# Patient Record
Sex: MALE | Race: WHITE | NOT HISPANIC OR LATINO | Employment: OTHER | URBAN - METROPOLITAN AREA
[De-identification: names, ages, dates, MRNs, and addresses within clinical notes are randomized per-mention and may not be internally consistent; named-entity substitution may affect disease eponyms.]

---

## 2017-05-06 ENCOUNTER — APPOINTMENT (OUTPATIENT)
Dept: MRI IMAGING | Facility: CLINIC | Age: 67
End: 2017-05-06
Attending: EMERGENCY MEDICINE
Payer: MEDICARE

## 2017-05-06 ENCOUNTER — HOSPITAL ENCOUNTER (EMERGENCY)
Facility: CLINIC | Age: 67
Discharge: HOME | End: 2017-05-06
Attending: EMERGENCY MEDICINE | Admitting: EMERGENCY MEDICINE
Payer: MEDICARE

## 2017-05-06 VITALS
WEIGHT: 185 LBS | SYSTOLIC BLOOD PRESSURE: 124 MMHG | OXYGEN SATURATION: 98 % | HEART RATE: 64 BPM | TEMPERATURE: 97.6 F | BODY MASS INDEX: 24.52 KG/M2 | RESPIRATION RATE: 18 BRPM | HEIGHT: 73 IN | DIASTOLIC BLOOD PRESSURE: 94 MMHG

## 2017-05-06 DIAGNOSIS — D69.6 THROMBOCYTOPENIA (H): ICD-10-CM

## 2017-05-06 DIAGNOSIS — R42 VERTIGO: ICD-10-CM

## 2017-05-06 LAB
ANION GAP SERPL CALCULATED.3IONS-SCNC: 6 MMOL/L (ref 3–14)
BASOPHILS # BLD AUTO: 0 10E9/L (ref 0–0.2)
BASOPHILS NFR BLD AUTO: 0.4 %
BUN SERPL-MCNC: 17 MG/DL (ref 7–30)
CALCIUM SERPL-MCNC: 8.6 MG/DL (ref 8.5–10.1)
CHLORIDE SERPL-SCNC: 108 MMOL/L (ref 94–109)
CO2 SERPL-SCNC: 29 MMOL/L (ref 20–32)
CREAT SERPL-MCNC: 0.82 MG/DL (ref 0.66–1.25)
DIFFERENTIAL METHOD BLD: ABNORMAL
EOSINOPHIL # BLD AUTO: 0.1 10E9/L (ref 0–0.7)
EOSINOPHIL NFR BLD AUTO: 2 %
ERYTHROCYTE [DISTWIDTH] IN BLOOD BY AUTOMATED COUNT: 12.4 % (ref 10–15)
GFR SERPL CREATININE-BSD FRML MDRD: ABNORMAL ML/MIN/1.7M2
GLUCOSE SERPL-MCNC: 140 MG/DL (ref 70–99)
HCT VFR BLD AUTO: 44.9 % (ref 40–53)
HGB BLD-MCNC: 15.7 G/DL (ref 13.3–17.7)
IMM GRANULOCYTES # BLD: 0 10E9/L (ref 0–0.4)
IMM GRANULOCYTES NFR BLD: 0.6 %
INTERPRETATION ECG - MUSE: NORMAL
LYMPHOCYTES # BLD AUTO: 1 10E9/L (ref 0.8–5.3)
LYMPHOCYTES NFR BLD AUTO: 14.7 %
MCH RBC QN AUTO: 31.5 PG (ref 26.5–33)
MCHC RBC AUTO-ENTMCNC: 35 G/DL (ref 31.5–36.5)
MCV RBC AUTO: 90 FL (ref 78–100)
MONOCYTES # BLD AUTO: 0.3 10E9/L (ref 0–1.3)
MONOCYTES NFR BLD AUTO: 4.2 %
NEUTROPHILS # BLD AUTO: 5.4 10E9/L (ref 1.6–8.3)
NEUTROPHILS NFR BLD AUTO: 78.1 %
NRBC # BLD AUTO: 0 10*3/UL
NRBC BLD AUTO-RTO: 0 /100
PLATELET # BLD AUTO: 127 10E9/L (ref 150–450)
POTASSIUM SERPL-SCNC: 3.7 MMOL/L (ref 3.4–5.3)
RBC # BLD AUTO: 4.98 10E12/L (ref 4.4–5.9)
SODIUM SERPL-SCNC: 143 MMOL/L (ref 133–144)
WBC # BLD AUTO: 6.9 10E9/L (ref 4–11)

## 2017-05-06 PROCEDURE — A9585 GADOBUTROL INJECTION: HCPCS | Performed by: EMERGENCY MEDICINE

## 2017-05-06 PROCEDURE — 25000131 ZZH RX MED GY IP 250 OP 636 PS 637: Performed by: EMERGENCY MEDICINE

## 2017-05-06 PROCEDURE — 99285 EMERGENCY DEPT VISIT HI MDM: CPT | Mod: 25

## 2017-05-06 PROCEDURE — 85025 COMPLETE CBC W/AUTO DIFF WBC: CPT | Performed by: EMERGENCY MEDICINE

## 2017-05-06 PROCEDURE — 25500064 ZZH RX 255 OP 636: Performed by: EMERGENCY MEDICINE

## 2017-05-06 PROCEDURE — 70544 MR ANGIOGRAPHY HEAD W/O DYE: CPT

## 2017-05-06 PROCEDURE — 25000132 ZZH RX MED GY IP 250 OP 250 PS 637: Performed by: EMERGENCY MEDICINE

## 2017-05-06 PROCEDURE — 70549 MR ANGIOGRAPH NECK W/O&W/DYE: CPT

## 2017-05-06 PROCEDURE — 27210995 ZZH RX 272: Performed by: EMERGENCY MEDICINE

## 2017-05-06 PROCEDURE — 70553 MRI BRAIN STEM W/O & W/DYE: CPT

## 2017-05-06 PROCEDURE — 80048 BASIC METABOLIC PNL TOTAL CA: CPT | Performed by: EMERGENCY MEDICINE

## 2017-05-06 PROCEDURE — 93005 ELECTROCARDIOGRAM TRACING: CPT

## 2017-05-06 RX ORDER — ONDANSETRON 4 MG/1
4 TABLET, ORALLY DISINTEGRATING ORAL EVERY 4 HOURS PRN
Qty: 10 TABLET | Refills: 0 | Status: SHIPPED | OUTPATIENT
Start: 2017-05-06 | End: 2017-05-09

## 2017-05-06 RX ORDER — GADOBUTROL 604.72 MG/ML
10 INJECTION INTRAVENOUS ONCE
Status: COMPLETED | OUTPATIENT
Start: 2017-05-06 | End: 2017-05-06

## 2017-05-06 RX ORDER — ACYCLOVIR 200 MG/1
60 CAPSULE ORAL ONCE
Status: COMPLETED | OUTPATIENT
Start: 2017-05-06 | End: 2017-05-06

## 2017-05-06 RX ORDER — MECLIZINE HYDROCHLORIDE 25 MG/1
25 TABLET ORAL ONCE
Status: COMPLETED | OUTPATIENT
Start: 2017-05-06 | End: 2017-05-06

## 2017-05-06 RX ORDER — MECLIZINE HYDROCHLORIDE 25 MG/1
25 TABLET ORAL EVERY 6 HOURS PRN
Qty: 20 TABLET | Refills: 0 | Status: SHIPPED | OUTPATIENT
Start: 2017-05-06

## 2017-05-06 RX ORDER — LORAZEPAM 1 MG/1
1 TABLET ORAL ONCE
Status: COMPLETED | OUTPATIENT
Start: 2017-05-06 | End: 2017-05-06

## 2017-05-06 RX ADMIN — SODIUM CHLORIDE 60 ML: 9 INJECTION, SOLUTION INTRAMUSCULAR; INTRAVENOUS; SUBCUTANEOUS at 10:47

## 2017-05-06 RX ADMIN — GADOBUTROL 10 ML: 604.72 INJECTION INTRAVENOUS at 10:47

## 2017-05-06 RX ADMIN — LORAZEPAM 1 MG: 1 TABLET ORAL at 08:21

## 2017-05-06 RX ADMIN — MECLIZINE HYDROCHLORIDE 25 MG: 25 TABLET ORAL at 08:20

## 2017-05-06 ASSESSMENT — ENCOUNTER SYMPTOMS
PHOTOPHOBIA: 0
NAUSEA: 1
RHINORRHEA: 1
NECK PAIN: 1
VOMITING: 0
COUGH: 0
HEADACHES: 0
WEAKNESS: 0
DIZZINESS: 1
NUMBNESS: 0

## 2017-05-06 NOTE — ED AVS SNAPSHOT
Emergency Department    [address] [place] [address]    Phone:  [phone]    Fax:  [phone]                                       George Smith   MRN: [id number]    Department:   Emergency Department   Date of Visit:  5/6/2017           After Visit Summary Signature Page     I have received my discharge instructions, and my questions have been answered. I have discussed any challenges I see with this plan with the nurse or doctor.    ..........................................................................................................................................  Patient/Patient Representative Signature      ..........................................................................................................................................  Patient Representative Print Name and Relationship to Patient    ..................................................               ................................................  Date                                            Time    ..........................................................................................................................................  Reviewed by Signature/Title    ...................................................              ..............................................  Date                                                            Time

## 2017-05-06 NOTE — ED PROVIDER NOTES
History     Chief Complaint:  Dizziness      HPI   George Smith is a 66 year old male who presents with dizziness.  The patient reports waking up in the middle of the night feeling dizzy, as if the room was spinning.  He continued to feel dizzy and off balance today, especially noting his equilibrium seems off when walking.  He also feels slightly nauseous.  He has been going to a chiropractor regularly for some neck pain and had a new practitioner yesterday who seemed to have more difficulty adjusting his neck.  He had no increased neck pain at that time or since.  He denies any numbness, tingling, or weakness in his arms or legs.  He has had no ringing in his ears or sensitivity to light.  He has had some recent allergy symptoms for which he normally takes Claritin however is out and started taking a short acting OTC allergy pill in the past few days.  He denies any recent cough or cold symptoms.  He was told his blood pressure was elevated in the past but more recently this has been actually on the low side after he quit drinking alcohol about 5 months ago.      Allergies:  No known drug allergies.     Medications:    OTC allergy medication   Zoloft  Clonazepam     Past Medical History:    Benign prostatic hypertrophy   Anxiety     Past Surgical History:    Transurethral prostatectomy     Family History:    History reviewed. No pertinent family history.     Social History:  Presents to the ED alone.  Living Situation: lives with his mother   Tobacco Use: No previous or current tobacco use.   Alcohol Use: Previous alcohol abuse, sober x 5 months    Review of Systems   HENT: Positive for rhinorrhea and sneezing. Negative for congestion, hearing loss and tinnitus.    Eyes: Negative for photophobia.   Respiratory: Negative for cough.    Gastrointestinal: Positive for nausea. Negative for vomiting.   Musculoskeletal: Positive for neck pain.   Neurological: Positive for dizziness. Negative for weakness, numbness and  "headaches.   All other systems reviewed and are negative.    Physical Exam   First Vitals:  BP: (!) 147/102  Pulse: 63  Temp: 97.6  F (36.4  C)  Resp: 16  Height: 185.4 cm (6' 1\")  Weight: 83.9 kg (185 lb)  SpO2: 97 %      Physical Exam  Constitutional: White male sitting.   HENT: No signs of trauma.   Eyes: EOM are normal. Pupils are 3 mm, equal, round, and reactive to light. No nystagmus.  TMs clear  Neck: Normal range of motion. No JVD present. No cervical adenopathy. No bruits.  Cardiovascular: Regular rhythm.  Exam reveals no gallop and no friction rub.    No murmur heard.  Pulmonary/Chest: Bilateral breath sounds normal. No wheezes, rhonchi or rales.  Abdominal: Soft. No tenderness. No rebound or guarding.   Musculoskeletal: No edema. No tenderness.   Lymphadenopathy: No lymphadenopathy.   Neurological: Alert and oriented to person, place, and time. Normal strength. No facial asymmetry.  Fluent speech.  Normal sensation. Normal finger nose finger. Gait slightly unsteady. Romberg positive.  Skin: Skin is warm and dry. No rash noted. No erythema.      Emergency Department Course   ECG:  @ 0818  Indication: dizziness  Vent. Rate 61 bpm. AK interval 180 ms. QRS duration 84 ms. QT/QTc 426/428 ms. P-R-T axis 61 35 8.   Normal sinus rhythm.  Normal ECG.    Read by Dr. Kong.     Imaging:  Brain MRI without and with contrast:  1. Minimal atrophy and chronic white matter disease.  2. Nothing acute.  Report per radiology.    Neck MRA without and with contrast:  1. Moderate left internal carotid artery origin stenosis.  2. Exam otherwise normal.  3. No evidence for dissection.   Report per radiology.    Head MRA without and with contrast:  Normal [place] MRA.  Report per radiology.    Radiographic findings were communicated with the patient who voiced understanding of the findings.    Laboratory:  CBC:  WBC 6.9, HGB 15.7   BMP: Glucose 140 (H), otherwise WNL (Creatinine 0.82)     Interventions:  (0820) " Meclizine, 25 mg, PO  (0821) Ativan, 1 mg, PO    Emergency Department Course:  Nursing notes and vitals reviewed.  I performed an exam of the patient as documented above.     EKG was done, interpretation as above.    A peripheral IV was established. Blood was drawn from the patient. This was sent for laboratory testing, findings above.       The patient was sent for a brain MRI and head/neck MRA while in the emergency department, findings above.      (1144) Findings and plan explained to the patient.   Patient discharged home with instructions regarding supportive care, medications, and reasons to return. The importance of close follow-up was reviewed. The patient was prescribed Meclizine and Zofran.     Impression & Plan      Medical Decision Making:  Mr. Smith is a 66 year old male who presents to the ED with dizziness.  Mr. Smith states he woke up today and he had an moving type of sensation and when he tried to walk he seemed unbalanced.  He denied any cough or cold symptoms although he does have allergy symptoms and did take a new allergy pill.  He has no double vision, speech or swallowing problems.  He had been a drinker but quit 5 months ago.  On exam I cannot elicit  nystagmus, his TMs are clear, and neurologically he is intact except when he tries to get up and walk and he is unsteady with a positive Romberg.  He received Meclizine and workup includes EKG and blood which is unremarkable except for slightly low platelet count.  MRI brain/MRA of the head and neck is unremarkable other than 60% internal carotid stenosis on the left side.  Patient is improved with his Meclizine.  He is not completely asymptomatic but is stable to be discharged home.  I will give him Meclizine and Zofran.  He does not want any Prednisone and I have referred him to ENT for follow up.  If symptoms worsen, return to the ED.    Diagnosis:    ICD-10-CM    1. Vertigo R42    2.      Thrombocytopenia    Plan:  As noted.    Discharge  Medications:  New Prescriptions    MECLIZINE (ANTIVERT) 25 MG TABLET    Take 1 tablet (25 mg) by mouth every 6 hours as needed for dizziness    ONDANSETRON (ZOFRAN ODT) 4 MG ODT TAB    Take 1 tablet (4 mg) by mouth every 4 hours as needed       ILynn, am serving as a scribe on 5/6/2017 at 8:00 AM to personally document services performed by Dr. Kong based on my observations and the provider's statements to me.    Lnyn Saldana  5/6/2017    EMERGENCY DEPARTMENT       Matthew Kong MD  05/06/17 [id number]

## 2017-05-06 NOTE — ED AVS SNAPSHOT
Emergency Department    [address] [facility] [address]    Phone:  [phone]    Fax:  [phone]                                       George Smith   MRN: [id number]    Department:   Emergency Department   Date of Visit:  5/6/2017           Patient Information     Date Of Birth          1950        Your diagnoses for this visit were:     Vertigo        You were seen by Matthew Kong MD.      Follow-up Information     Schedule an appointment as soon as possible for a visit with Yves Vyas MD.    Specialty:  Otolaryngology    Why:  recheck ed, If symptoms worsen    Contact information:    [facility]  [address] [id number] [phone]          Discharge Instructions         Understanding Dizziness, Balance Problems, and Fainting  Balance is a group effort of the eyes, inner ear, joints, and muscles. They each send signals to the brain about body position and head movement. Then the brain uses this information to achieve balance. When the brain receives conflicting signals, or when there is a problem with blood flow, dizziness or fainting can happen.    Vertigo  Vertigo is the feeling of spinning. It may happen if the brain receives conflicting balance signals. Vertigo is often caused by a problem in the inner ear. Problems include changes in inner ear structures, infection, swelling, or excess fluid. Sometimes vertigo is due to a brain problem, such as migraine.   Dysequilibrium  Dysequilibrium is the feeling of imbalance without a sense of spinning. It may happen if the signal path between the body and brain is disrupted. There are many causes of dysequilibrium, including diabetes, anemia, head injury, and aging.  Syncope  Syncope is losing consciousness or fainting. The brain needs oxygen-rich blood to function. The heart pumps that blood to the brain. If there is a problem with the heart, blood flow (such as low blood pressure), or  blood vessels, faintness may happen.    7824-5931 [employer]. [address] [place] [address]. All rights reserved. This information is not intended as a substitute for professional medical care. Always follow your healthcare professional's instructions.          Managing Dizziness (Vertigo) with Medications  Although medications can't cure your problem, they can help control symptoms. Your doctor may prescribe medications for a few weeks and then taper them off.  If you have side effects from your medications, contact your healthcare provider right away.   How medications can help      Treat infection or inflammation. If you have a bacterial infection, your doctor can prescribe antibiotics.    Limit conflicting balance signals. These medications are often in pill form.    Ease nausea. Suppositories, pills, or shots may be used to reduce vomiting.    Reduce pressure in the canals. Diuretics can be used to treat Meniere's disease. These medications help rid the body of excess fluid.    Ease other symptoms. Other medications can help ease depression and anxiety caused by living with dizziness or fainting.    2405-1583 [employer]. [address] [place] [address]. All rights reserved. This information is not intended as a substitute for professional medical care. Always follow your healthcare professional's instructions.          24 Hour Appointment Hotline       To make an appointment at any [facility], call [phone]). If you don't have a family doctor or clinic, we will help you find one. [facility] clinics are conveniently located to serve the needs of you and your family.             Review of your medicines      START taking        Dose / Directions Last dose taken    meclizine 25 MG tablet   Commonly known as:  ANTIVERT   Dose:  25 mg   Quantity:  20 tablet        Take 1 tablet (25 mg) by mouth every 6 hours as needed for dizziness    Refills:  0        ondansetron 4 MG ODT tab   Commonly known as:  ZOFRAN ODT   Dose:  4 mg   Quantity:  10 tablet        Take 1 tablet (4 mg) by mouth every 4 hours as needed   Refills:  0          Our records show that you are taking the medicines listed below. If these are incorrect, please call your family doctor or clinic.        Dose / Directions Last dose taken    KLONOPIN PO   Dose:  0.5 mg        Take 0.5 mg by mouth daily   Refills:  0        ZOLOFT PO   Dose:  100 mg        Take 100 mg by mouth daily   Refills:  0                Prescriptions were sent or printed at these locations (2 Prescriptions)                   Other Prescriptions                Printed at Department/Unit printer (2 of 2)         ondansetron (ZOFRAN ODT) 4 MG ODT tab               meclizine (ANTIVERT) 25 MG tablet                Procedures and tests performed during your visit     Basic metabolic panel    CBC with platelets differential    EKG 12 lead    MR Brain w/o & w Contrast    MR Head w/o Contrast Angiogram    MR Neck w/o & w Contrast Angiogram      Orders Needing Specimen Collection     None      Pending Results     No orders found from 5/4/2017 to 5/7/2017.            Pending Culture Results     No orders found from 5/4/2017 to 5/7/2017.            Pending Results Instructions     If you had any lab results that were not finalized at the time of your Discharge, you can call the ED Lab Result RN at [phone]. You will be contacted by this team for any positive Lab results or changes in treatment. The nurses are available 7 days a week from 10A to 6:30P.  You can leave a message 24 hours per day and they will return your call.        Test Results From Your Hospital Stay        5/6/2017  8:38 AM      Component Results     Component Value Ref Range & Units Status    Sodium 143 133 - 144 mmol/L Final    Potassium 3.7 3.4 - 5.3 mmol/L Final    Chloride 108 94 - 109 mmol/L Final    Carbon Dioxide 29 20 - 32 mmol/L Final     Anion Gap 6 3 - 14 mmol/L Final    Glucose 140 (H) 70 - 99 mg/dL Final    Urea Nitrogen 17 7 - 30 mg/dL Final    Creatinine 0.82 0.66 - 1.25 mg/dL Final    GFR Estimate >90  Non  GFR Calc   >60 mL/min/1.7m2 Final    GFR Estimate If Black >90   GFR Calc   >60 mL/min/1.7m2 Final    Calcium 8.6 8.5 - 10.1 mg/dL Final         5/6/2017  8:24 AM      Component Results     Component Value Ref Range & Units Status    WBC 6.9 4.0 - 11.0 10e9/L Final    RBC Count 4.98 4.4 - 5.9 10e12/L Final    Hemoglobin 15.7 13.3 - 17.7 g/dL Final    Hematocrit 44.9 40.0 - 53.0 % Final    MCV 90 78 - 100 fl Final    MCH 31.5 26.5 - 33.0 pg Final    MCHC 35.0 31.5 - 36.5 g/dL Final    RDW 12.4 10.0 - 15.0 % Final    Platelet Count 127 (L) 150 - 450 10e9/L Final    Diff Method Automated Method  Final    % Neutrophils 78.1 % Final    % Lymphocytes 14.7 % Final    % Monocytes 4.2 % Final    % Eosinophils 2.0 % Final    % Basophils 0.4 % Final    % Immature Granulocytes 0.6 % Final    Nucleated RBCs 0 0 /100 Final    Absolute Neutrophil 5.4 1.6 - 8.3 10e9/L Final    Absolute Lymphocytes 1.0 0.8 - 5.3 10e9/L Final    Absolute Monocytes 0.3 0.0 - 1.3 10e9/L Final    Absolute Eosinophils 0.1 0.0 - 0.7 10e9/L Final    Absolute Basophils 0.0 0.0 - 0.2 10e9/L Final    Abs Immature Granulocytes 0.0 0 - 0.4 10e9/L Final    Absolute Nucleated RBC 0.0  Final         5/6/2017 11:58 AM      Narrative     MR BRAIN WITH AND WITHOUT CONTRAST May 6, 2017 10:48 AM    HISTORY: Dizziness, unsteady gait.    COMPARISON: None.    TECHNIQUE: Sagittal T1, axial T2, axial FLAIR, axial GRE, axial  diffusion scan, coronal FLAIR, axial post Gd enhanced T1 weighted  images. 10 mL gadolinium.      FINDINGS: Mild atrophy. No intracranial hemorrhage, mass, or recent  infarct. There are only a few tiny foci T2 hyperintensity in the white  matter of each cerebral hemisphere consistent with minimal small  vessel ischemic change. No pathologic  enhancement.    No significant sinus disease.        Impression     IMPRESSION:  1. Minimal atrophy and chronic white matter disease.  2. Nothing acute.    FRENCH BERRY MD         5/6/2017 11:58 AM      Narrative     MRA ANGIOGRAM HEAD WITHOUT CONTRAST May 6, 2017 10:05 AM    HISTORY: Dizziness with unsteady gait.    COMPARISON: None.    TECHNIQUE: Routine [place] MRA.    FINDINGS: Normal. Large-caliber vessels are patent. No evidence for  aneurysm.        Impression     IMPRESSION: Normal [place] MRA.    FRENCH BERRY MD         5/6/2017 11:58 AM      Narrative     MRA ANGIOGRAM NECK WITH AND WITHOUT CONTRAST May 6, 2017 10:58 AM     HISTORY: Dizziness and unsteady gait.    TECHNIQUE: 2D time-of-flight MR angiogram of the neck without contrast  and 3D MR angiogram of the neck with 10 mL gadolinium IV. Estimates of  carotid stenoses are made relative to the distal internal carotid  artery diameters except as noted.    COMPARISON: MR brain today.    FINDINGS: There is moderate motion artifact but this scan is  considered diagnostic.    Right carotid: Normal.    Left carotid: Moderate atheromatous narrowing of the left internal  carotid artery origin with stenosis in the 60% range.    Vertebral and basilar: Normal.    Aortic arch and intrathoracic arteries: Normal.        Impression     IMPRESSION:   1. Moderate left internal carotid artery origin stenosis.  2. Exam otherwise normal.  3. No evidence for dissection.     FRENCH BERRY MD                Clinical Quality Measure: Blood Pressure Screening     Your blood pressure was checked while you were in the emergency department today. The last reading we obtained was  BP: 141/90 . Please read the guidelines below about what these numbers mean and what you should do about them.  If your systolic blood pressure (the top number) is less than 120 and your diastolic blood pressure (the bottom number) is less than 80, then your blood pressure is  "normal. There is nothing more that you need to do about it.  If your systolic blood pressure (the top number) is 120-139 or your diastolic blood pressure (the bottom number) is 80-89, your blood pressure may be higher than it should be. You should have your blood pressure rechecked within a year by a primary care provider.  If your systolic blood pressure (the top number) is 140 or greater or your diastolic blood pressure (the bottom number) is 90 or greater, you may have high blood pressure. High blood pressure is treatable, but if left untreated over time it can put you at risk for heart attack, stroke, or kidney failure. You should have your blood pressure rechecked by a primary care provider within the next 4 weeks.  If your provider in the emergency department today gave you specific instructions to follow-up with your doctor or provider even sooner than that, you should follow that instruction and not wait for up to 4 weeks for your follow-up visit.        Thank you for choosing [facility]       Thank you for choosing [facility] for your care. Our goal is always to provide you with excellent care. Hearing back from our patients is one way we can continue to improve our services. Please take a few minutes to complete the written survey that you may receive in the mail after you visit with us. Thank you!        [facility] Information     [employer] lets you send messages to your doctor, view your test results, renew your prescriptions, schedule appointments and more. To sign up, go to [email or web address] . Click on \"Log in\" on the left side of the screen, which will take you to the Welcome page. Then click on \"Sign up Now\" on the right side of the page.     You will be asked to enter the access code listed below, as well as some personal information. Please follow the directions to create your username and password.     Your access code is: [email or web address]  Expires: 2017 12:11 PM     Your access code will  in " 90 days. If you need help or a new code, please call your [facility] clinic or [phone].        Care EveryWhere ID     This is your Care EveryWhere ID. This could be used by other organizations to access your [facility] medical records  [id number]        After Visit Summary       This is your record. Keep this with you and show to your community pharmacist(s) and doctor(s) at your next visit.

## 2017-05-06 NOTE — DISCHARGE INSTRUCTIONS
Understanding Dizziness, Balance Problems, and Fainting  Balance is a group effort of the eyes, inner ear, joints, and muscles. They each send signals to the brain about body position and head movement. Then the brain uses this information to achieve balance. When the brain receives conflicting signals, or when there is a problem with blood flow, dizziness or fainting can happen.    Vertigo  Vertigo is the feeling of spinning. It may happen if the brain receives conflicting balance signals. Vertigo is often caused by a problem in the inner ear. Problems include changes in inner ear structures, infection, swelling, or excess fluid. Sometimes vertigo is due to a brain problem, such as migraine.   Dysequilibrium  Dysequilibrium is the feeling of imbalance without a sense of spinning. It may happen if the signal path between the body and brain is disrupted. There are many causes of dysequilibrium, including diabetes, anemia, head injury, and aging.  Syncope  Syncope is losing consciousness or fainting. The brain needs oxygen-rich blood to function. The heart pumps that blood to the brain. If there is a problem with the heart, blood flow (such as low blood pressure), or blood vessels, faintness may happen.    8258-6560 [employer]. [address] [place] [address]. All rights reserved. This information is not intended as a substitute for professional medical care. Always follow your healthcare professional's instructions.          Managing Dizziness (Vertigo) with Medications  Although medications can't cure your problem, they can help control symptoms. Your doctor may prescribe medications for a few weeks and then taper them off.  If you have side effects from your medications, contact your healthcare provider right away.   How medications can help      Treat infection or inflammation. If you have a bacterial infection, your doctor can prescribe antibiotics.    Limit conflicting balance signals.  These medications are often in pill form.    Ease nausea. Suppositories, pills, or shots may be used to reduce vomiting.    Reduce pressure in the canals. Diuretics can be used to treat Meniere's disease. These medications help rid the body of excess fluid.    Ease other symptoms. Other medications can help ease depression and anxiety caused by living with dizziness or fainting.    0699-3782 [employer]. [address] [place] [address], [place] [address]. All rights reserved. This information is not intended as a substitute for professional medical care. Always follow your healthcare professional's instructions.

## 2021-11-26 ENCOUNTER — APPOINTMENT (OUTPATIENT)
Dept: CT IMAGING | Facility: CLINIC | Age: 71
End: 2021-11-26
Attending: EMERGENCY MEDICINE
Payer: COMMERCIAL

## 2021-11-26 ENCOUNTER — HOSPITAL ENCOUNTER (EMERGENCY)
Facility: CLINIC | Age: 71
Discharge: HOME | End: 2021-11-26
Attending: EMERGENCY MEDICINE | Admitting: EMERGENCY MEDICINE
Payer: COMMERCIAL

## 2021-11-26 VITALS
RESPIRATION RATE: 14 BRPM | SYSTOLIC BLOOD PRESSURE: 137 MMHG | BODY MASS INDEX: 26.41 KG/M2 | HEART RATE: 76 BPM | DIASTOLIC BLOOD PRESSURE: 90 MMHG | OXYGEN SATURATION: 99 % | WEIGHT: 195 LBS | TEMPERATURE: 97.9 F | HEIGHT: 72 IN

## 2021-11-26 DIAGNOSIS — R31.9 HEMATURIA, UNSPECIFIED TYPE: ICD-10-CM

## 2021-11-26 LAB
ALBUMIN UR-MCNC: 50 MG/DL
ANION GAP SERPL CALCULATED.3IONS-SCNC: 7 MMOL/L (ref 3–14)
APPEARANCE UR: ABNORMAL
BASOPHILS # BLD AUTO: 0 10E3/UL (ref 0–0.2)
BASOPHILS NFR BLD AUTO: 1 %
BILIRUB UR QL STRIP: NEGATIVE
BUN SERPL-MCNC: 14 MG/DL (ref 7–30)
CALCIUM SERPL-MCNC: 8.6 MG/DL (ref 8.5–10.1)
CHLORIDE BLD-SCNC: 106 MMOL/L (ref 94–109)
CO2 SERPL-SCNC: 26 MMOL/L (ref 20–32)
COLOR UR AUTO: ABNORMAL
CREAT SERPL-MCNC: 0.89 MG/DL (ref 0.66–1.25)
EOSINOPHIL # BLD AUTO: 0.1 10E3/UL (ref 0–0.7)
EOSINOPHIL NFR BLD AUTO: 2 %
ERYTHROCYTE [DISTWIDTH] IN BLOOD BY AUTOMATED COUNT: 12 % (ref 10–15)
GFR SERPL CREATININE-BSD FRML MDRD: 87 ML/MIN/1.73M2
GLUCOSE BLD-MCNC: 114 MG/DL (ref 70–99)
GLUCOSE UR STRIP-MCNC: NEGATIVE MG/DL
HCT VFR BLD AUTO: 46.8 % (ref 40–53)
HGB BLD-MCNC: 15.8 G/DL (ref 13.3–17.7)
HGB UR QL STRIP: ABNORMAL
HOLD SPECIMEN: NORMAL
IMM GRANULOCYTES # BLD: 0 10E3/UL
IMM GRANULOCYTES NFR BLD: 1 %
KETONES UR STRIP-MCNC: NEGATIVE MG/DL
LEUKOCYTE ESTERASE UR QL STRIP: ABNORMAL
LYMPHOCYTES # BLD AUTO: 1.2 10E3/UL (ref 0.8–5.3)
LYMPHOCYTES NFR BLD AUTO: 20 %
MCH RBC QN AUTO: 31.2 PG (ref 26.5–33)
MCHC RBC AUTO-ENTMCNC: 33.8 G/DL (ref 31.5–36.5)
MCV RBC AUTO: 93 FL (ref 78–100)
MONOCYTES # BLD AUTO: 0.5 10E3/UL (ref 0–1.3)
MONOCYTES NFR BLD AUTO: 8 %
NEUTROPHILS # BLD AUTO: 4.1 10E3/UL (ref 1.6–8.3)
NEUTROPHILS NFR BLD AUTO: 68 %
NITRATE UR QL: NEGATIVE
NRBC # BLD AUTO: 0 10E3/UL
NRBC BLD AUTO-RTO: 0 /100
PH UR STRIP: 5.5 [PH] (ref 5–7)
PLATELET # BLD AUTO: 180 10E3/UL (ref 150–450)
POTASSIUM BLD-SCNC: 3.5 MMOL/L (ref 3.4–5.3)
RBC # BLD AUTO: 5.06 10E6/UL (ref 4.4–5.9)
RBC URINE: 63 /HPF
SODIUM SERPL-SCNC: 139 MMOL/L (ref 133–144)
SP GR UR STRIP: 1.01 (ref 1–1.03)
SQUAMOUS EPITHELIAL: <1 /HPF
UROBILINOGEN UR STRIP-MCNC: NORMAL MG/DL
WBC # BLD AUTO: 5.9 10E3/UL (ref 4–11)
WBC URINE: 1 /HPF

## 2021-11-26 PROCEDURE — 85025 COMPLETE CBC W/AUTO DIFF WBC: CPT | Performed by: EMERGENCY MEDICINE

## 2021-11-26 PROCEDURE — 81001 URINALYSIS AUTO W/SCOPE: CPT | Performed by: EMERGENCY MEDICINE

## 2021-11-26 PROCEDURE — 250N000011 HC RX IP 250 OP 636: Performed by: EMERGENCY MEDICINE

## 2021-11-26 PROCEDURE — 258N000003 HC RX IP 258 OP 636: Performed by: EMERGENCY MEDICINE

## 2021-11-26 PROCEDURE — 96360 HYDRATION IV INFUSION INIT: CPT | Mod: 59

## 2021-11-26 PROCEDURE — 74177 CT ABD & PELVIS W/CONTRAST: CPT

## 2021-11-26 PROCEDURE — 250N000009 HC RX 250: Performed by: EMERGENCY MEDICINE

## 2021-11-26 PROCEDURE — 80048 BASIC METABOLIC PNL TOTAL CA: CPT | Performed by: EMERGENCY MEDICINE

## 2021-11-26 PROCEDURE — 36415 COLL VENOUS BLD VENIPUNCTURE: CPT | Performed by: EMERGENCY MEDICINE

## 2021-11-26 PROCEDURE — 99285 EMERGENCY DEPT VISIT HI MDM: CPT | Mod: 25

## 2021-11-26 RX ORDER — IOPAMIDOL 755 MG/ML
98 INJECTION, SOLUTION INTRAVASCULAR ONCE
Status: COMPLETED | OUTPATIENT
Start: 2021-11-26 | End: 2021-11-26

## 2021-11-26 RX ADMIN — IOPAMIDOL 98 ML: 755 INJECTION, SOLUTION INTRAVENOUS at 09:08

## 2021-11-26 RX ADMIN — SODIUM CHLORIDE 1000 ML: 9 INJECTION, SOLUTION INTRAVENOUS at 07:45

## 2021-11-26 RX ADMIN — SODIUM CHLORIDE 68 ML: 900 INJECTION INTRAVENOUS at 09:08

## 2021-11-26 ASSESSMENT — ENCOUNTER SYMPTOMS
DIFFICULTY URINATING: 0
HEMATURIA: 1
FREQUENCY: 0

## 2021-11-26 ASSESSMENT — MIFFLIN-ST. JEOR: SCORE: 1682.51

## 2021-11-26 NOTE — ED PROVIDER NOTES
History   Chief Complaint:  Hematuria    HPI   George Smith is a 71 year old male with a history of a right renal mass who presents with hematuria. The patient reports that he has been having hematuria for the past week and was passing a lot of clots today. The patient has been seeing hematuria periodically for the past year and has been following with Dr. Blanca of urology at [facility]. He had a recent CT and was found to have a renal mass on his right kidney, but has not had anything for this done yet. The patient has an upcoming appointment with Dr. Blanca next week. He states that he would occasionally have clots, but over the past few days has been having larger and more frequent clots. He notes that his urine is a dark red color. The patient also mentions having a TURP 6 years ago. He last urinated an hour ago. He denies any urinary frequency or difficulty urinating.  He does not feel as though he completely empties his bladder.  He does not have any suprapubic pain or complaints of urinary retention.  Of note he is not anticoagulated.    Review of Systems   Genitourinary: Positive for hematuria. Negative for difficulty urinating and frequency.   All other systems reviewed and are negative.        Allergies:  The patient has no known allergies.     Medications:  Klonopin  Antivert  Zoloft    Past Medical History:     Anxiety  Depressive disorder  Seasonal allergies  Substance abuse   Insomnia   Renal mass     Past Surgical History:    TURP  Hernia repair  Knee arthroplasty    Family History:    Mother: cataract, glaucoma    Social History:  The patient presents to the emergency department alone.      Physical Exam     Patient Vitals for the past 24 hrs:   BP Temp Pulse Resp SpO2 Height Weight   11/26/21 1027 (!) 137/90 -- 76 14 99 % -- --   11/26/21 0909 (!) 166/90 -- 89 18 99 % -- --   11/26/21 0709 (!) 147/96 97.9  F (36.6  C) 76 20 99 % 1.829 m (6') 88.5 kg (195 lb)       Physical Exam    Physical  Exam   Constitutional:  Patient is oriented to person, place, and time. They appear well-developed and well-nourished. Mild distress secondary to his increasing hematuria   HENT:   Eyes:    Conjunctivae normal and EOM are normal. Pupils are equal, round, and reactive to light.   Neck:    Normal range of motion.   Cardiovascular: Normal rate, regular rhythm and normal heart sounds.  Exam reveals no gallop and no friction rub.  No murmur heard.  Pulmonary/Chest:  Effort normal and breath sounds normal. Patient has no wheezes. Patient has no rales.   Abdominal:   Soft. Bowel sounds are normal. Patient exhibits no mass. There is no tenderness. There is no rebound and no guarding. No CVA tenderness.   Musculoskeletal:  Normal range of motion. Patient exhibits no edema.   Neurological:   Patient is alert and oriented to person, place, and time. Patient has normal strength. No cranial nerve deficit or sensory deficit. GCS 15  Skin:   Skin is warm and dry. No rash noted. No erythema.   Psychiatric:   Patient has a normal mood and affect. Patient's behavior is normal. Judgment and thought content normal.       Emergency Department Course     Imaging:  CT Abdomen Pelvis W Contrast  1.  2.5 cm hypoenhancing mass upper pole of the right kidney is  indeterminant, and reportedly has been seen on prior outside CTs.  2.  Possible small blood clot in the bladder.  3.  BPH.  4.  No hydronephrosis or urinary tract calculi.  Reading per radiology.    Laboratory:  CBC: WBC 5.9, HGB 15.8,    BMP: Glucose 114 (H) o/w WNL (Creatinine 0.89)     UA with microscopic: Color: red, Appearance bloody, Blood large, Protein Albumin 50, Leukocyte Esterase trace, RBC 63 (H) o/w WNL      Emergency Department Course:  Reviewed:  I reviewed nursing notes, vitals, past medical history and Care Everywhere    Assessments:  0734 I obtained history and examined the patient as noted above.   0853 I rechecked the patient and explained  findings.  0939 His post void residual was 14 cc's.   0945 I rechecked and updated the patient.      Interventions:  0745 NS, 1 L, IV bolus     Disposition:  The patient was discharged to home.     Impression & Plan     Medical Decision Making:  George Smith is a 71 year old male presenting with hematuria. He has had this off and on for some time and follows with urology, however it has gotten worse in the last day. He states he is still able to empty his bladder, does not feel as if he has any urinary retention and no dysuria. Physical exam was fairly unremarkable. No CVA tenderness or suprapubic pain. Post void residual was performed which was minimal. Urine is negative for infection, but significantly grossly bloody. I did do a CT, knowing that he had one within the last month, but wanting to make sure that there was no active hemorrhage. Everything looks stable. At this point, he is safe for discharge. He will follow up with his urologist. He was made aware that if he does develop urinary retention to return to the emergency department as a goel catheter would need to be replace. Patient expressed understanding of these instructions.     Diagnosis:    ICD-10-CM    1. Hematuria, unspecified type  R31.9        Discharge Medications:  None     Scribe Disclosure:  Jesus GRAVES, am serving as a scribe at 7:28 AM on 11/26/2021 to document services personally performed by Sneha Galvez MD based on my observations and the provider's statements to me.                Sneha Galvez MD  11/26/21 1522